# Patient Record
Sex: MALE | Race: WHITE | Employment: OTHER | URBAN - METROPOLITAN AREA
[De-identification: names, ages, dates, MRNs, and addresses within clinical notes are randomized per-mention and may not be internally consistent; named-entity substitution may affect disease eponyms.]

---

## 2020-09-01 NOTE — PATIENT DISCUSSION
New Prescription: ciprofloxacin (ciprofloxacin): drops: 0.3% 1 drop four times a day as directed into right eye 09-

## 2021-12-15 ENCOUNTER — CONSULTATION/EVALUATION (OUTPATIENT)
Dept: URBAN - METROPOLITAN AREA CLINIC 35 | Facility: CLINIC | Age: 51
End: 2021-12-15

## 2021-12-15 DIAGNOSIS — H25.811: ICD-10-CM

## 2021-12-15 DIAGNOSIS — E11.9: ICD-10-CM

## 2021-12-15 DIAGNOSIS — H25.812: ICD-10-CM

## 2021-12-15 PROCEDURE — V2799PMN IMPRIMIS PRED-MOXI-NEPAF 5ML

## 2021-12-15 PROCEDURE — 92004 COMPRE OPH EXAM NEW PT 1/>: CPT

## 2021-12-15 PROCEDURE — 92136TC INTERFEROMETRY - TECHNICAL COMPONENT

## 2021-12-15 ASSESSMENT — VISUAL ACUITY
OS_SC: J10
OD_SC: 20/20-1
OS_SC: 20/40-1
OS_BAT: 20/80
OD_SC: J4

## 2021-12-15 ASSESSMENT — TONOMETRY
OD_IOP_MMHG: 14
OS_IOP_MMHG: 14

## 2022-01-04 ENCOUNTER — PRE-OP/H&P (OUTPATIENT)
Dept: URBAN - METROPOLITAN AREA SURGERY 14 | Facility: SURGERY | Age: 52
End: 2022-01-04

## 2022-01-04 ENCOUNTER — SURGERY/PROCEDURE (OUTPATIENT)
Dept: URBAN - METROPOLITAN AREA CLINIC 35 | Facility: CLINIC | Age: 52
End: 2022-01-04

## 2022-01-04 DIAGNOSIS — H21.81: ICD-10-CM

## 2022-01-04 DIAGNOSIS — E11.9: ICD-10-CM

## 2022-01-04 DIAGNOSIS — H25.812: ICD-10-CM

## 2022-01-04 PROCEDURE — 6698254 COMPLEX CATARACT (SX ONLY)

## 2022-01-04 PROCEDURE — 99211T TECH SERVICE

## 2022-10-05 ENCOUNTER — SURGERY/PROCEDURE (OUTPATIENT)
Dept: URBAN - METROPOLITAN AREA SURGERY 14 | Facility: SURGERY | Age: 52
End: 2022-10-05

## 2022-10-05 ENCOUNTER — CONSULTATION/EVALUATION (OUTPATIENT)
Dept: URBAN - METROPOLITAN AREA CLINIC 39 | Facility: CLINIC | Age: 52
End: 2022-10-05

## 2022-10-05 DIAGNOSIS — H26.492: ICD-10-CM

## 2022-10-05 PROCEDURE — 6682154 YAG CAPSULOTOMY(SX ONLY)

## 2022-10-05 PROCEDURE — 92014 COMPRE OPH EXAM EST PT 1/>: CPT

## 2022-10-05 ASSESSMENT — TONOMETRY
OD_IOP_MMHG: 12
OS_IOP_MMHG: 12

## 2022-10-05 ASSESSMENT — VISUAL ACUITY
OS_BAT: 20/200
OS_SC: 20/20
OD_SC: 20/20